# Patient Record
Sex: MALE | Race: WHITE | NOT HISPANIC OR LATINO | Employment: OTHER | URBAN - METROPOLITAN AREA
[De-identification: names, ages, dates, MRNs, and addresses within clinical notes are randomized per-mention and may not be internally consistent; named-entity substitution may affect disease eponyms.]

---

## 2023-03-08 DIAGNOSIS — E78.5 HYPERLIPIDEMIA, UNSPECIFIED HYPERLIPIDEMIA TYPE: ICD-10-CM

## 2023-03-08 RX ORDER — AMLODIPINE BESYLATE 5 MG/1
5 TABLET ORAL DAILY
COMMUNITY
End: 2023-05-04 | Stop reason: SDUPTHER

## 2023-03-08 RX ORDER — TRAMADOL HYDROCHLORIDE 50 MG/1
TABLET ORAL EVERY 6 HOURS PRN
COMMUNITY

## 2023-03-08 RX ORDER — BIOTIN 10 MG
1 TABLET ORAL DAILY
COMMUNITY

## 2023-03-08 RX ORDER — NAPROXEN SODIUM 220 MG/1
1 TABLET, FILM COATED ORAL DAILY
COMMUNITY
Start: 2022-07-14

## 2023-03-08 RX ORDER — NITROGLYCERIN 0.4 MG/1
TABLET SUBLINGUAL EVERY 5 MIN PRN
COMMUNITY
Start: 2022-08-03

## 2023-03-08 RX ORDER — LORAZEPAM 1 MG/1
TABLET ORAL
COMMUNITY
Start: 2023-01-09

## 2023-03-08 RX ORDER — GUAIFENESIN 600 MG/1
1 TABLET, EXTENDED RELEASE ORAL EVERY 12 HOURS
COMMUNITY
Start: 2022-08-10

## 2023-03-08 RX ORDER — OXYCODONE AND ACETAMINOPHEN 10; 325 MG/1; MG/1
TABLET ORAL EVERY 6 HOURS PRN
COMMUNITY
Start: 2023-02-23

## 2023-03-08 RX ORDER — GABAPENTIN 400 MG/1
800 CAPSULE ORAL 3 TIMES DAILY
COMMUNITY
End: 2023-04-04 | Stop reason: SDUPTHER

## 2023-03-08 RX ORDER — ENOXAPARIN SODIUM 150 MG/ML
INJECTION SUBCUTANEOUS
COMMUNITY
Start: 2022-04-29

## 2023-03-08 RX ORDER — POTASSIUM CHLORIDE 20 MEQ/15ML
15 SOLUTION ORAL
COMMUNITY
Start: 2023-02-28 | End: 2023-05-04 | Stop reason: SDUPTHER

## 2023-03-08 RX ORDER — OMEPRAZOLE 40 MG/1
40 CAPSULE, DELAYED RELEASE ORAL DAILY
COMMUNITY
Start: 2020-01-06 | End: 2023-07-10 | Stop reason: SDUPTHER

## 2023-03-08 RX ORDER — HYDROXYZINE HYDROCHLORIDE 25 MG/1
25 TABLET, FILM COATED ORAL 3 TIMES DAILY PRN
COMMUNITY

## 2023-03-08 RX ORDER — TAMSULOSIN HYDROCHLORIDE 0.4 MG/1
2 CAPSULE ORAL NIGHTLY
COMMUNITY
Start: 2020-04-27

## 2023-03-08 RX ORDER — VITAMIN B COMPLEX
TABLET ORAL
COMMUNITY

## 2023-03-08 RX ORDER — ATORVASTATIN CALCIUM 20 MG/1
20 TABLET, FILM COATED ORAL NIGHTLY
Qty: 90 TABLET | Refills: 1 | Status: SHIPPED | OUTPATIENT
Start: 2023-03-08 | End: 2023-05-04 | Stop reason: SDUPTHER

## 2023-03-08 RX ORDER — TRAZODONE HYDROCHLORIDE 100 MG/1
1.5 TABLET ORAL NIGHTLY
COMMUNITY
Start: 2019-10-09 | End: 2023-03-28 | Stop reason: SDUPTHER

## 2023-03-08 RX ORDER — SUCRALFATE 1 G/1
TABLET ORAL
COMMUNITY

## 2023-03-08 RX ORDER — MONTELUKAST SODIUM 10 MG/1
10 TABLET ORAL NIGHTLY
COMMUNITY
Start: 2022-05-20

## 2023-03-08 RX ORDER — ATORVASTATIN CALCIUM 20 MG/1
20 TABLET, FILM COATED ORAL NIGHTLY
COMMUNITY
End: 2023-03-08 | Stop reason: SDUPTHER

## 2023-03-08 RX ORDER — ONDANSETRON HYDROCHLORIDE 8 MG/1
TABLET, FILM COATED ORAL 3 TIMES DAILY PRN
COMMUNITY
Start: 2023-01-25

## 2023-03-08 RX ORDER — PREDNISONE 20 MG/1
1 TABLET ORAL DAILY
COMMUNITY
Start: 2022-11-28

## 2023-03-08 RX ORDER — FLUTICASONE PROPIONATE 50 MCG
2 SPRAY, SUSPENSION (ML) NASAL DAILY
COMMUNITY
Start: 2022-02-09 | End: 2023-04-11 | Stop reason: SDUPTHER

## 2023-03-08 RX ORDER — TRIAMCINOLONE ACETONIDE 1 MG/G
CREAM TOPICAL 2 TIMES DAILY PRN
COMMUNITY
Start: 2023-02-08

## 2023-03-08 RX ORDER — ALBUTEROL SULFATE 90 UG/1
2 AEROSOL, METERED RESPIRATORY (INHALATION) EVERY 6 HOURS PRN
COMMUNITY
Start: 2019-10-10

## 2023-03-08 RX ORDER — BUSPIRONE HYDROCHLORIDE 5 MG/1
5 TABLET ORAL 4 TIMES DAILY
COMMUNITY

## 2023-03-08 RX ORDER — METOPROLOL TARTRATE 50 MG/1
50 TABLET ORAL DAILY
COMMUNITY
Start: 2019-10-24

## 2023-03-08 RX ORDER — CHOLECALCIFEROL (VITAMIN D3) 25 MCG
1 TABLET ORAL DAILY
COMMUNITY

## 2023-03-08 RX ORDER — WARFARIN SODIUM 5 MG/1
TABLET ORAL
COMMUNITY
Start: 2022-11-10 | End: 2023-04-11

## 2023-03-08 RX ORDER — ACETAMINOPHEN 650 MG/1
1300 TABLET, EXTENDED RELEASE ORAL EVERY 8 HOURS PRN
COMMUNITY
Start: 2019-11-26

## 2023-03-08 RX ORDER — PYRIDOXINE HCL (VITAMIN B6) 100 MG
1 TABLET ORAL DAILY
COMMUNITY

## 2023-03-08 RX ORDER — ATORVASTATIN CALCIUM 20 MG/1
20 TABLET, FILM COATED ORAL NIGHTLY
Qty: 90 TABLET | Refills: 1 | Status: CANCELLED | OUTPATIENT
Start: 2023-03-08

## 2023-03-08 RX ORDER — DIPHENHYDRAMINE HCL 12.5 MG/5ML
LIQUID ORAL
COMMUNITY
Start: 2023-02-18

## 2023-03-08 RX ORDER — FLUTICASONE PROPIONATE AND SALMETEROL 100; 50 UG/1; UG/1
1 POWDER RESPIRATORY (INHALATION) EVERY 12 HOURS
COMMUNITY
End: 2023-05-30 | Stop reason: SDUPTHER

## 2023-03-28 DIAGNOSIS — F41.9 ANXIETY: ICD-10-CM

## 2023-03-28 RX ORDER — TRAZODONE HYDROCHLORIDE 100 MG/1
150 TABLET ORAL NIGHTLY
Qty: 135 TABLET | Refills: 1 | Status: SHIPPED | OUTPATIENT
Start: 2023-03-28 | End: 2023-07-07

## 2023-04-04 DIAGNOSIS — M54.16 CHRONIC RADICULAR LUMBAR PAIN: Primary | ICD-10-CM

## 2023-04-04 DIAGNOSIS — G89.29 CHRONIC RADICULAR LUMBAR PAIN: Primary | ICD-10-CM

## 2023-04-04 RX ORDER — GABAPENTIN 400 MG/1
800 CAPSULE ORAL 3 TIMES DAILY
Qty: 540 CAPSULE | Refills: 0 | Status: SHIPPED | OUTPATIENT
Start: 2023-04-04 | End: 2023-08-15 | Stop reason: SDUPTHER

## 2023-04-06 DIAGNOSIS — J30.9 ALLERGIC RHINITIS, UNSPECIFIED SEASONALITY, UNSPECIFIED TRIGGER: ICD-10-CM

## 2023-04-06 DIAGNOSIS — I26.99 PULMONARY EMBOLISM, OTHER, UNSPECIFIED CHRONICITY, UNSPECIFIED WHETHER ACUTE COR PULMONALE PRESENT (MULTI): ICD-10-CM

## 2023-04-11 PROBLEM — I26.99 PULMONARY EMBOLISM (MULTI): Status: ACTIVE | Noted: 2023-04-11

## 2023-04-11 RX ORDER — WARFARIN SODIUM 5 MG/1
TABLET ORAL
Qty: 90 TABLET | Refills: 3 | Status: SHIPPED | OUTPATIENT
Start: 2023-04-11 | End: 2023-09-08 | Stop reason: CLARIF

## 2023-04-11 RX ORDER — FLUTICASONE PROPIONATE 50 MCG
2 SPRAY, SUSPENSION (ML) NASAL DAILY
Qty: 48 G | Refills: 1 | Status: SHIPPED | OUTPATIENT
Start: 2023-04-11 | End: 2023-09-08 | Stop reason: CLARIF

## 2023-05-04 DIAGNOSIS — E78.5 HYPERLIPIDEMIA, UNSPECIFIED HYPERLIPIDEMIA TYPE: ICD-10-CM

## 2023-05-04 DIAGNOSIS — I10 ESSENTIAL HYPERTENSION: ICD-10-CM

## 2023-05-04 RX ORDER — AMLODIPINE BESYLATE 5 MG/1
5 TABLET ORAL DAILY
Qty: 90 TABLET | Refills: 1 | Status: SHIPPED | OUTPATIENT
Start: 2023-05-04 | End: 2023-09-08 | Stop reason: CLARIF

## 2023-05-04 RX ORDER — POTASSIUM CHLORIDE 20 MEQ/15ML
20 SOLUTION ORAL ONCE
Qty: 90 ML | Refills: 1 | Status: SHIPPED | OUTPATIENT
Start: 2023-05-04 | End: 2023-05-04

## 2023-05-04 RX ORDER — ATORVASTATIN CALCIUM 20 MG/1
20 TABLET, FILM COATED ORAL NIGHTLY
Qty: 90 TABLET | Refills: 1 | Status: SHIPPED | OUTPATIENT
Start: 2023-05-04 | End: 2023-09-08 | Stop reason: CLARIF

## 2023-05-18 ENCOUNTER — PATIENT OUTREACH (OUTPATIENT)
Dept: CARE COORDINATION | Facility: CLINIC | Age: 63
End: 2023-05-18
Payer: COMMERCIAL

## 2023-05-18 DIAGNOSIS — D61.818 PANCYTOPENIA, ACQUIRED (MULTI): ICD-10-CM

## 2023-05-18 DIAGNOSIS — R53.1 GENERALIZED WEAKNESS: ICD-10-CM

## 2023-05-18 DIAGNOSIS — E43 SEVERE PROTEIN-CALORIE MALNUTRITION (MULTI): ICD-10-CM

## 2023-05-18 DIAGNOSIS — D53.9 MACROCYTIC ANEMIA: ICD-10-CM

## 2023-05-18 PROCEDURE — G0180 MD CERTIFICATION HHA PATIENT: HCPCS | Performed by: NURSE PRACTITIONER

## 2023-05-18 NOTE — PROGRESS NOTES
Discharge Facility:[facility]  Discharge Diagnosis:E43 Severe protein-calorie malnutrition, D53.9 Macrocytic anemia, D61.818 Pancytopenia, acquired, R53.1 Generalized weakness  Admission Date:5/13/23  Discharge Date: 5/17/23    PCP Appointment Date:5/24/23  Specialist Appointment Date:   Hospital Encounter and Summary: Linked   See discharge assessment below for further details    Engagement  Call Start Time: 1422 (5/18/2023  2:32 PM)    Medications  Medications reviewed with patient/caregiver?: Not applicable (5/18/2023  2:32 PM)  Is the patient having any side effects they believe may be caused by any medication additions or changes?: No (5/18/2023  2:32 PM)  Does the patient have all medications ordered at discharge?: Not applicable (5/18/2023  2:32 PM)  Care Management Interventions: Provided patient education (5/18/2023  2:32 PM)  Is the patient taking all medications as directed (includes completed medication regime)?: Not applicable (5/18/2023  2:32 PM)  Care Management Interventions: Provided patient education (5/18/2023  2:32 PM)    Appointments  Does the patient have a primary care provider?: Yes (5/18/2023  2:32 PM)  Care Management Interventions: Verified appointment date/time/provider (5/18/2023  2:32 PM)  Has the patient kept scheduled appointments due by today?: Yes (5/18/2023  2:32 PM)  Care Management Interventions: Advised patient to keep appointment; Educated on importance of keeping appointment (5/18/2023  2:32 PM)    Self Management  What is the home health agency?: [facility] (5/18/2023  2:32 PM)  Has home health visited the patient within 72 hours of discharge?: Yes (5/18/2023  2:32 PM)  What Durable Medical Equipment (DME) was ordered?: Oxygen (5/18/2023  2:32 PM)  Has all Durable Medical Equipment (DME) been delivered?: -- (Patient not sure if oxygen has been delivered at this time) (5/18/2023  2:32 PM)    Patient Teaching  Does the patient have access to their discharge instructions?: Yes  (5/18/2023  2:32 PM)  Care Management Interventions: Reviewed instructions with patient (5/18/2023  2:32 PM)  What is the patient's perception of their health status since discharge?: Improving (5/18/2023  2:32 PM)  Is the patient/caregiver able to teach back the hierarchy of who to call/visit for symptoms/problems? PCP, Specialist, Home Health nurse, Urgent Care, ED, 911: Yes (5/18/2023  2:32 PM)  Patient/Caregiver Education Comments: Patient states he is improving but did have a fall at home after being discharged (5/18/2023  2:32 PM)

## 2023-05-24 ENCOUNTER — APPOINTMENT (OUTPATIENT)
Dept: PRIMARY CARE | Facility: CLINIC | Age: 63
End: 2023-05-24
Payer: COMMERCIAL

## 2023-05-24 DIAGNOSIS — C15.5 MALIGNANT NEOPLASM OF LOWER THIRD OF ESOPHAGUS (MULTI): ICD-10-CM

## 2023-05-24 DIAGNOSIS — J44.9 CHRONIC OBSTRUCTIVE PULMONARY DISEASE, UNSPECIFIED COPD TYPE (MULTI): ICD-10-CM

## 2023-05-24 PROBLEM — R35.0 INCREASED URINARY FREQUENCY: Status: ACTIVE | Noted: 2023-05-24

## 2023-05-24 PROBLEM — K22.89 ESOPHAGEAL MASS: Status: ACTIVE | Noted: 2023-05-24

## 2023-05-24 NOTE — PROGRESS NOTES
Pt called stating that he needs Rx Orders for a Wide Walker, and a Wide Portable Commode.  Rxs proposed to go to [facility].

## 2023-05-26 ENCOUNTER — TELEPHONE (OUTPATIENT)
Dept: PRIMARY CARE | Facility: CLINIC | Age: 63
End: 2023-05-26
Payer: COMMERCIAL

## 2023-05-26 NOTE — TELEPHONE ENCOUNTER
Stated that pt currently has liquid potassium.  Inquiring if you would send in a tablet or capsule instead.  Pt does not like the taste of the liquid.

## 2023-05-30 DIAGNOSIS — J44.9 CHRONIC OBSTRUCTIVE PULMONARY DISEASE, UNSPECIFIED COPD TYPE (MULTI): Primary | ICD-10-CM

## 2023-05-30 RX ORDER — FLUTICASONE PROPIONATE AND SALMETEROL 100; 50 UG/1; UG/1
1 POWDER RESPIRATORY (INHALATION) EVERY 12 HOURS
Qty: 180 EACH | Refills: 1 | Status: SHIPPED | OUTPATIENT
Start: 2023-05-30 | End: 2023-06-14 | Stop reason: SDUPTHER

## 2023-05-31 ENCOUNTER — APPOINTMENT (OUTPATIENT)
Dept: PRIMARY CARE | Facility: CLINIC | Age: 63
End: 2023-05-31
Payer: COMMERCIAL

## 2023-06-06 ENCOUNTER — PATIENT OUTREACH (OUTPATIENT)
Dept: CARE COORDINATION | Facility: CLINIC | Age: 63
End: 2023-06-06
Payer: COMMERCIAL

## 2023-06-06 DIAGNOSIS — I95.9 HYPOTENSION, UNSPECIFIED HYPOTENSION TYPE: ICD-10-CM

## 2023-06-06 RX ORDER — HYDROCHLOROTHIAZIDE 12.5 MG/1
CAPSULE ORAL
COMMUNITY
Start: 2023-06-05 | End: 2023-07-04

## 2023-06-06 RX ORDER — L. ACIDOPHILUS/LACTOBAC SPOR 35MM-25MM
TABLET ORAL
COMMUNITY
Start: 2023-06-05 | End: 2023-07-05 | Stop reason: SDUPTHER

## 2023-06-06 RX ORDER — FLUDROCORTISONE ACETATE 0.1 MG/1
1 TABLET ORAL DAILY
Qty: 29 TABLET | Refills: 0 | COMMUNITY
Start: 2023-06-05 | End: 2023-07-04

## 2023-06-06 NOTE — PROGRESS NOTES
Discharge Facility:[facility]  Discharge Diagnosis:I95.9 Hypotension  Admission Date:5/31/23  Discharge Date:6/5/23    PCP Appointment Date:6/14/23  Specialist Appointment Date:   Hospital Encounter and Summary: Linked   See discharge assessment below for further details    Engagement  Call Start Time: 0954 (6/6/2023  9:59 AM)    Medications  Medications reviewed with patient/caregiver?: Yes (6/6/2023  9:59 AM)  Is the patient having any side effects they believe may be caused by any medication additions or changes?: No (6/6/2023  9:59 AM)  Does the patient have all medications ordered at discharge?: Yes (6/6/2023  9:59 AM)  Care Management Interventions: Provided patient education (6/6/2023  9:59 AM)  Is the patient taking all medications as directed (includes completed medication regime)?: Yes (6/6/2023  9:59 AM)  Care Management Interventions: Provided patient education (6/6/2023  9:59 AM)  Medication Comments: see med list (6/6/2023  9:59 AM)    Appointments  Does the patient have a primary care provider?: Yes (6/6/2023  9:59 AM)  Care Management Interventions: Verified appointment date/time/provider (6/6/2023  9:59 AM)  Has the patient kept scheduled appointments due by today?: Yes (6/6/2023  9:59 AM)  Care Management Interventions: Advised patient to keep appointment; Educated on importance of keeping appointment (6/6/2023  9:59 AM)    Self Management  What is the home health agency?: [facility] (6/6/2023  9:59 AM)  Has home health visited the patient within 72 hours of discharge?: Call prior to 72 hours (6/6/2023  9:59 AM)    Patient Teaching  Does the patient have access to their discharge instructions?: Yes (6/6/2023  9:59 AM)  Care Management Interventions: Reviewed instructions with patient (6/6/2023  9:59 AM)  What is the patient's perception of their health status since discharge?: Improving (6/6/2023  9:59 AM)  Is the patient/caregiver able to teach back the hierarchy of who to call/visit for  symptoms/problems? PCP, Specialist, Home Health nurse, Urgent Care, ED, 911: Yes (6/6/2023  9:59 AM)

## 2023-06-08 ENCOUNTER — DOCUMENTATION (OUTPATIENT)
Dept: CARE COORDINATION | Facility: CLINIC | Age: 63
End: 2023-06-08
Payer: COMMERCIAL

## 2023-06-09 ENCOUNTER — TELEPHONE (OUTPATIENT)
Dept: PRIMARY CARE | Facility: CLINIC | Age: 63
End: 2023-06-09
Payer: COMMERCIAL

## 2023-06-09 NOTE — TELEPHONE ENCOUNTER
Radha with [facility] called stating they had a visit for follow up from hospital.  They have order to draw CBC.  Inquiring if you want to add any orders.  Will fax order to [phone], if orders added.

## 2023-06-13 ENCOUNTER — TELEPHONE (OUTPATIENT)
Dept: PRIMARY CARE | Facility: CLINIC | Age: 63
End: 2023-06-13
Payer: COMMERCIAL

## 2023-06-13 NOTE — TELEPHONE ENCOUNTER
Spoke to Amos. Someone from oncology/hematology spoke with them this morning and he is increasing his potassium, and they changed his diuretic. He was unsure what they changed it to. He has a follow up with me tomorrow afternoon. He was told to bring his new medications with him to his appointment, so we can update his chart. He verbalized understanding.     ----- Message from Erica Allred DO sent at 6/12/2023  9:53 PM EDT -----  Regarding: K  Took call from lab with critical potassium of 2.6. I tried to call patient to discuss/assess, but unable to reach him x2.

## 2023-06-14 ENCOUNTER — OFFICE VISIT (OUTPATIENT)
Dept: PRIMARY CARE | Facility: CLINIC | Age: 63
End: 2023-06-14
Payer: COMMERCIAL

## 2023-06-14 VITALS
DIASTOLIC BLOOD PRESSURE: 76 MMHG | SYSTOLIC BLOOD PRESSURE: 122 MMHG | HEIGHT: 72 IN | BODY MASS INDEX: 43.54 KG/M2 | OXYGEN SATURATION: 97 % | HEART RATE: 102 BPM

## 2023-06-14 DIAGNOSIS — I26.99 PULMONARY EMBOLISM WITHOUT ACUTE COR PULMONALE, UNSPECIFIED CHRONICITY, UNSPECIFIED PULMONARY EMBOLISM TYPE (MULTI): ICD-10-CM

## 2023-06-14 DIAGNOSIS — J44.9 CHRONIC OBSTRUCTIVE PULMONARY DISEASE, UNSPECIFIED COPD TYPE (MULTI): Primary | ICD-10-CM

## 2023-06-14 DIAGNOSIS — I10 ESSENTIAL HYPERTENSION: ICD-10-CM

## 2023-06-14 DIAGNOSIS — R53.1 WEAKNESS: ICD-10-CM

## 2023-06-14 DIAGNOSIS — F33.9 DEPRESSION, RECURRENT (CMS-HCC): ICD-10-CM

## 2023-06-14 DIAGNOSIS — E66.01 CLASS 3 SEVERE OBESITY DUE TO EXCESS CALORIES WITH SERIOUS COMORBIDITY AND BODY MASS INDEX (BMI) OF 40.0 TO 44.9 IN ADULT (MULTI): ICD-10-CM

## 2023-06-14 PROBLEM — F41.9 ANXIETY: Status: ACTIVE | Noted: 2018-06-20

## 2023-06-14 PROBLEM — E29.1 HYPOGONADISM IN MALE: Status: ACTIVE | Noted: 2023-06-14

## 2023-06-14 PROBLEM — R06.00 DYSPNEA: Status: ACTIVE | Noted: 2017-06-26

## 2023-06-14 PROBLEM — R35.1 NOCTURIA: Status: RESOLVED | Noted: 2023-06-14 | Resolved: 2023-06-14

## 2023-06-14 PROBLEM — F17.210 SMOKING GREATER THAN 20 PACK YEARS: Status: ACTIVE | Noted: 2023-06-14

## 2023-06-14 PROBLEM — M19.019 OSTEOARTHRITIS OF AC (ACROMIOCLAVICULAR) JOINT: Status: RESOLVED | Noted: 2023-06-14 | Resolved: 2023-06-14

## 2023-06-14 PROBLEM — Z98.890 HISTORY OF STRABISMUS SURGERY: Status: RESOLVED | Noted: 2019-09-24 | Resolved: 2023-06-14

## 2023-06-14 PROBLEM — M75.42 IMPINGEMENT SYNDROME OF LEFT SHOULDER: Status: ACTIVE | Noted: 2023-06-14

## 2023-06-14 PROBLEM — K50.111: Status: ACTIVE | Noted: 2023-06-14

## 2023-06-14 PROBLEM — E87.6 HYPOKALEMIA: Status: ACTIVE | Noted: 2023-06-14

## 2023-06-14 PROBLEM — M75.102 TEAR OF LEFT ROTATOR CUFF: Status: RESOLVED | Noted: 2023-06-14 | Resolved: 2023-06-14

## 2023-06-14 PROBLEM — D53.9 MACROCYTIC ANEMIA: Status: ACTIVE | Noted: 2023-06-14

## 2023-06-14 PROBLEM — R13.10 DIFFICULTY SWALLOWING: Status: ACTIVE | Noted: 2023-06-14

## 2023-06-14 PROBLEM — R31.9 HEMATURIA: Status: ACTIVE | Noted: 2023-06-14

## 2023-06-14 PROBLEM — R94.31 PROLONGED QT INTERVAL: Status: RESOLVED | Noted: 2017-06-27 | Resolved: 2023-06-14

## 2023-06-14 PROBLEM — E43 SEVERE PROTEIN-CALORIE MALNUTRITION (GOMEZ: LESS THAN 60% OF STANDARD WEIGHT) (MULTI): Status: ACTIVE | Noted: 2023-06-14

## 2023-06-14 PROBLEM — N52.9 ERECTILE DYSFUNCTION: Status: ACTIVE | Noted: 2023-06-14

## 2023-06-14 PROBLEM — N40.1 BENIGN PROSTATIC HYPERPLASIA WITH LOWER URINARY TRACT SYMPTOMS: Status: ACTIVE | Noted: 2023-06-14

## 2023-06-14 PROBLEM — E78.5 HYPERLIPIDEMIA: Status: ACTIVE | Noted: 2023-06-14

## 2023-06-14 PROBLEM — K21.9 GERD (GASTROESOPHAGEAL REFLUX DISEASE): Status: ACTIVE | Noted: 2023-06-14

## 2023-06-14 PROBLEM — I25.10 CORONARY ARTERY DISEASE: Status: ACTIVE | Noted: 2019-09-27

## 2023-06-14 PROBLEM — G47.00 INSOMNIA: Status: ACTIVE | Noted: 2023-06-14

## 2023-06-14 PROBLEM — D64.9 ANEMIA: Status: ACTIVE | Noted: 2023-06-14

## 2023-06-14 PROBLEM — N32.9 LESION OF BLADDER: Status: ACTIVE | Noted: 2023-06-14

## 2023-06-14 PROBLEM — E53.8 VITAMIN B12 DEFICIENCY: Status: ACTIVE | Noted: 2023-06-14

## 2023-06-14 PROBLEM — Z98.42 STATUS POST CATARACT EXTRACTION AND INSERTION OF INTRAOCULAR LENS OF LEFT EYE: Status: RESOLVED | Noted: 2019-12-17 | Resolved: 2023-06-14

## 2023-06-14 PROBLEM — Z96.1 STATUS POST CATARACT EXTRACTION AND INSERTION OF INTRAOCULAR LENS OF LEFT EYE: Status: RESOLVED | Noted: 2019-12-17 | Resolved: 2023-06-14

## 2023-06-14 PROBLEM — M75.82 TENDINITIS OF LEFT ROTATOR CUFF: Status: RESOLVED | Noted: 2023-06-14 | Resolved: 2023-06-14

## 2023-06-14 PROCEDURE — 99495 TRANSJ CARE MGMT MOD F2F 14D: CPT | Performed by: NURSE PRACTITIONER

## 2023-06-14 PROCEDURE — 3074F SYST BP LT 130 MM HG: CPT | Performed by: NURSE PRACTITIONER

## 2023-06-14 PROCEDURE — 3078F DIAST BP <80 MM HG: CPT | Performed by: NURSE PRACTITIONER

## 2023-06-14 RX ORDER — FLUTICASONE PROPIONATE AND SALMETEROL 100; 50 UG/1; UG/1
1 POWDER RESPIRATORY (INHALATION) EVERY 12 HOURS
Qty: 180 EACH | Refills: 1 | Status: SHIPPED | OUTPATIENT
Start: 2023-06-14 | End: 2023-09-08 | Stop reason: CLARIF

## 2023-06-14 RX ORDER — FAMOTIDINE 20 MG/1
TABLET, FILM COATED ORAL
COMMUNITY
Start: 2017-05-28

## 2023-06-14 ASSESSMENT — PATIENT HEALTH QUESTIONNAIRE - PHQ9
4. FEELING TIRED OR HAVING LITTLE ENERGY: NEARLY EVERY DAY
7. TROUBLE CONCENTRATING ON THINGS, SUCH AS READING THE NEWSPAPER OR WATCHING TELEVISION: NEARLY EVERY DAY
SUM OF ALL RESPONSES TO PHQ QUESTIONS 1-9: 24
8. MOVING OR SPEAKING SO SLOWLY THAT OTHER PEOPLE COULD HAVE NOTICED. OR THE OPPOSITE, BEING SO FIGETY OR RESTLESS THAT YOU HAVE BEEN MOVING AROUND A LOT MORE THAN USUAL: NEARLY EVERY DAY
9. THOUGHTS THAT YOU WOULD BE BETTER OFF DEAD, OR OF HURTING YOURSELF: NOT AT ALL
1. LITTLE INTEREST OR PLEASURE IN DOING THINGS: NEARLY EVERY DAY
10. IF YOU CHECKED OFF ANY PROBLEMS, HOW DIFFICULT HAVE THESE PROBLEMS MADE IT FOR YOU TO DO YOUR WORK, TAKE CARE OF THINGS AT HOME, OR GET ALONG WITH OTHER PEOPLE: VERY DIFFICULT
2. FEELING DOWN, DEPRESSED OR HOPELESS: NEARLY EVERY DAY
5. POOR APPETITE OR OVEREATING: NEARLY EVERY DAY
6. FEELING BAD ABOUT YOURSELF - OR THAT YOU ARE A FAILURE OR HAVE LET YOURSELF OR YOUR FAMILY DOWN: NEARLY EVERY DAY
3. TROUBLE FALLING OR STAYING ASLEEP OR SLEEPING TOO MUCH: NEARLY EVERY DAY
SUM OF ALL RESPONSES TO PHQ9 QUESTIONS 1 AND 2: 6

## 2023-06-14 ASSESSMENT — ANXIETY QUESTIONNAIRES
6. BECOMING EASILY ANNOYED OR IRRITABLE: NEARLY EVERY DAY
2. NOT BEING ABLE TO STOP OR CONTROL WORRYING: NEARLY EVERY DAY
7. FEELING AFRAID AS IF SOMETHING AWFUL MIGHT HAPPEN: NEARLY EVERY DAY
4. TROUBLE RELAXING: NEARLY EVERY DAY
5. BEING SO RESTLESS THAT IT IS HARD TO SIT STILL: NOT AT ALL
1. FEELING NERVOUS, ANXIOUS, OR ON EDGE: NEARLY EVERY DAY
3. WORRYING TOO MUCH ABOUT DIFFERENT THINGS: NEARLY EVERY DAY
IF YOU CHECKED OFF ANY PROBLEMS ON THIS QUESTIONNAIRE, HOW DIFFICULT HAVE THESE PROBLEMS MADE IT FOR YOU TO DO YOUR WORK, TAKE CARE OF THINGS AT HOME, OR GET ALONG WITH OTHER PEOPLE: VERY DIFFICULT
GAD7 TOTAL SCORE: 18

## 2023-06-14 NOTE — PROGRESS NOTES
Subjective   Patient ID: Nilesh Miramontes is a 62 y.o. male who presents for [facility] follow up; Right hand/finger/wrist numbness; left foot/toe pain; bed sores on buttocks.      HPI  Nilesh returns for hospital follow up.      Review of Systems    Objective   Vitals:    23 1529   BP: 122/76   BP Location: Right arm   Pulse: 102   SpO2: 97%   Height: 1.829 m (6')      Physical Exam  Vitals and nursing note reviewed.   Constitutional:       Appearance: Normal appearance. He is ill-appearing.   HENT:      Head: Normocephalic and atraumatic.   Cardiovascular:      Rate and Rhythm: Regular rhythm. Tachycardia present.      Pulses: Normal pulses.      Heart sounds: Normal heart sounds.   Pulmonary:      Effort: Pulmonary effort is normal.      Breath sounds: Normal breath sounds.      Comments: On oxygen per NC  Abdominal:      General: Bowel sounds are normal.      Palpations: Abdomen is soft.   Musculoskeletal:      Right lower le+ Pitting Edema present.      Left lower le+ Pitting Edema present.   Neurological:      Mental Status: He is alert and oriented to person, place, and time.   Psychiatric:         Mood and Affect: Mood normal.         Behavior: Behavior normal.         Thought Content: Thought content normal.         Judgment: Judgment normal.         Assessment/Plan   Diagnoses and all orders for this visit:  Chronic obstructive pulmonary disease, unspecified COPD type (CMS/HCC)  -     fluticasone propion-salmeteroL (Advair Diskus) 100-50 mcg/dose diskus inhaler; Inhale 1 puff every 12 hours.      Problem List Items Addressed This Visit       COPD (chronic obstructive pulmonary disease) (CMS/HCC)    Relevant Medications    fluticasone propion-salmeteroL (Advair Diskus) 100-50 mcg/dose diskus inhaler       Follow up as scheduled for medicare wellness exam.

## 2023-06-19 PROBLEM — R35.0 INCREASED URINARY FREQUENCY: Status: RESOLVED | Noted: 2023-05-24 | Resolved: 2023-06-19

## 2023-06-19 PROBLEM — E66.01 CLASS 3 SEVERE OBESITY DUE TO EXCESS CALORIES WITH SERIOUS COMORBIDITY AND BODY MASS INDEX (BMI) OF 40.0 TO 44.9 IN ADULT (MULTI): Status: ACTIVE | Noted: 2023-06-19

## 2023-06-19 PROBLEM — F33.9 DEPRESSION, RECURRENT (CMS-HCC): Status: ACTIVE | Noted: 2023-06-19

## 2023-06-19 LAB
ALANINE AMINOTRANSFERASE (SGPT) (U/L) IN SER/PLAS: 20 U/L (ref 10–52)
ALBUMIN (G/DL) IN SER/PLAS: 2.3 G/DL (ref 3.4–5)
ALKALINE PHOSPHATASE (U/L) IN SER/PLAS: 81 U/L (ref 33–136)
ANION GAP IN SER/PLAS: 11 MMOL/L (ref 10–20)
ASPARTATE AMINOTRANSFERASE (SGOT) (U/L) IN SER/PLAS: 37 U/L (ref 9–39)
BILIRUBIN TOTAL (MG/DL) IN SER/PLAS: 0.9 MG/DL (ref 0–1.2)
CALCIDIOL (25 OH VITAMIN D3) (NG/ML) IN SER/PLAS: 80 NG/ML
CALCIUM (MG/DL) IN SER/PLAS: 8.1 MG/DL (ref 8.6–10.3)
CARBON DIOXIDE, TOTAL (MMOL/L) IN SER/PLAS: 25 MMOL/L (ref 21–32)
CHLORIDE (MMOL/L) IN SER/PLAS: 99 MMOL/L (ref 98–107)
COBALAMIN (VITAMIN B12) (PG/ML) IN SER/PLAS: 1608 PG/ML (ref 211–911)
CREATININE (MG/DL) IN SER/PLAS: 0.7 MG/DL (ref 0.5–1.3)
GFR MALE: >90 ML/MIN/1.73M2
GLUCOSE (MG/DL) IN SER/PLAS: 88 MG/DL (ref 74–99)
MAGNESIUM (MG/DL) IN SER/PLAS: 1.7 MG/DL (ref 1.6–2.4)
NATRIURETIC PEPTIDE B (PG/ML) IN SER/PLAS: 177 PG/ML (ref 0–99)
POTASSIUM (MMOL/L) IN SER/PLAS: 3.1 MMOL/L (ref 3.5–5.3)
PROSTATE SPECIFIC ANTIGEN,SCREEN: 0.37 NG/ML (ref 0–4)
PROTEIN TOTAL: 7.6 G/DL (ref 6.4–8.2)
SODIUM (MMOL/L) IN SER/PLAS: 132 MMOL/L (ref 136–145)
UREA NITROGEN (MG/DL) IN SER/PLAS: 5 MG/DL (ref 6–23)

## 2023-06-19 ASSESSMENT — ENCOUNTER SYMPTOMS
APPETITE CHANGE: 1
ABDOMINAL PAIN: 0
FATIGUE: 1
SHORTNESS OF BREATH: 0
CHEST TIGHTNESS: 0
PSYCHIATRIC NEGATIVE: 1
DIZZINESS: 0
VOMITING: 0
PALPITATIONS: 0
MYALGIAS: 0
COLOR CHANGE: 0
TROUBLE SWALLOWING: 1
ARTHRALGIAS: 1
DYSURIA: 0
CONSTIPATION: 0
DIARRHEA: 0
BLOOD IN STOOL: 0
LIGHT-HEADEDNESS: 0
NAUSEA: 0
HEADACHES: 0
NUMBNESS: 1

## 2023-06-19 NOTE — PROGRESS NOTES
"Patient: Nilesh Miramontes  : 1960  PCP: PITO Lowery-CNP  MRN: [id number]  Program: No linked episodes     Nilesh Miramontes is a 62 y.o. male presenting today for follow-up after being discharged from the hospital 9 days ago. The main problem requiring admission was hypotension. The discharge summary and/or Transitional Care Management documentation was reviewed. Medication reconciliation was performed as indicated via the \"Anoop as Reviewed\" timestamp.     Nilesh Miramontes was contacted by Transitional Care Management services two days after his discharge. This encounter and supporting documentation was reviewed.    The complexity of medical decision making for this patient's transitional care is moderate.    Physical Exam  Vitals and nursing note reviewed.   Constitutional:       General: He is not in acute distress.     Appearance: Normal appearance. He is ill-appearing. He is not toxic-appearing or diaphoretic.   HENT:      Head: Normocephalic and atraumatic.   Cardiovascular:      Rate and Rhythm: Regular rhythm. Tachycardia present.      Heart sounds: Normal heart sounds.   Pulmonary:      Effort: Pulmonary effort is normal.      Breath sounds: Normal breath sounds.      Comments: On oxygen per [place] 2L  Musculoskeletal:      Right lower le+ Pitting Edema present.      Left lower le+ Pitting Edema present.   Skin:     General: Skin is warm and dry.   Neurological:      Mental Status: He is alert and oriented to person, place, and time.   Psychiatric:         Mood and Affect: Mood normal.         Behavior: Behavior normal.         Thought Content: Thought content normal.         Judgment: Judgment normal.         Assessment/Plan   Problem List Items Addressed This Visit       Pulmonary embolism (CMS/HCC)     On coumadin-managed in anti-coag clinic         Essential hypertension     Normotensive today         COPD (chronic obstructive pulmonary disease) (CMS/HCC) - Primary     Stable  Advair- " refilled         Relevant Medications    fluticasone propion-salmeteroL (Advair Diskus) 100-50 mcg/dose diskus inhaler    Weakness    Depression, recurrent (CMS/[facility])     Stable  Hydroxyzine and trazodone         Class 3 severe obesity due to excess calories with serious comorbidity and body mass index (BMI) of 40.0 to 44.9 in adult (CMS/[facility])       Review of Systems   Constitutional:  Positive for appetite change and fatigue.   HENT:  Positive for trouble swallowing.    Respiratory:  Negative for chest tightness and shortness of breath.    Cardiovascular:  Negative for chest pain, palpitations and leg swelling.   Gastrointestinal:  Negative for abdominal pain, blood in stool, constipation, diarrhea, nausea and vomiting.   Genitourinary:  Negative for dysuria.   Musculoskeletal:  Positive for arthralgias (left shoulder pain). Negative for myalgias.   Skin:  Negative for color change.   Allergic/Immunologic: Environmental allergies: VS.   Neurological:  Positive for numbness (right hand/fingers). Negative for dizziness, light-headedness and headaches.   Psychiatric/Behavioral: Negative.         Family History   Problem Relation Name Age of Onset    No Known Problems Mother      No Known Problems Father         Engagement  Call Start Time: 0954 (6/6/2023  9:59 AM)    Medications  Medications reviewed with patient/caregiver?: Yes (6/6/2023  9:59 AM)  Is the patient having any side effects they believe may be caused by any medication additions or changes?: No (6/6/2023  9:59 AM)  Does the patient have all medications ordered at discharge?: Yes (6/6/2023  9:59 AM)  Care Management Interventions: Provided patient education (6/6/2023  9:59 AM)  Is the patient taking all medications as directed (includes completed medication regime)?: Yes (6/6/2023  9:59 AM)  Care Management Interventions: Provided patient education (6/6/2023  9:59 AM)  Medication Comments: see med list (6/6/2023  9:59 AM)    Appointments  Does the patient  have a primary care provider?: Yes (6/6/2023  9:59 AM)  Care Management Interventions: Verified appointment date/time/provider (6/6/2023  9:59 AM)  Has the patient kept scheduled appointments due by today?: Yes (6/6/2023  9:59 AM)  Care Management Interventions: Advised patient to keep appointment; Educated on importance of keeping appointment (6/6/2023  9:59 AM)    Self Management  What is the home health agency?: [facility] (6/6/2023  9:59 AM)  Has home health visited the patient within 72 hours of discharge?: Call prior to 72 hours (6/6/2023  9:59 AM)    Patient Teaching  Does the patient have access to their discharge instructions?: Yes (6/6/2023  9:59 AM)  Care Management Interventions: Reviewed instructions with patient (6/6/2023  9:59 AM)  What is the patient's perception of their health status since discharge?: Improving (6/6/2023  9:59 AM)  Is the patient/caregiver able to teach back the hierarchy of who to call/visit for symptoms/problems? PCP, Specialist, Home Health nurse, Urgent Care, ED, 911: Yes (6/6/2023  9:59 AM)        No follow-ups on file.    He also has complaints today of right hand/finger numbness, left foot/toe pain, bed sores on buttocks.    HHC: nurse is applying proctofoam to area. Skin tears to buttocks. They are to teach, spouse, Ina how to help clean and treat area. Will continue to monitor.     VS; /76  Pulse: 102  SpO2: 97% on 2L oxygen/NC      Keep appt in 2 weeks for Medicare wellness exam.

## 2023-06-20 LAB
ESTIMATED AVERAGE GLUCOSE FOR HBA1C: 85 MG/DL
HEMOGLOBIN A1C/HEMOGLOBIN TOTAL IN BLOOD: 4.6 %

## 2023-06-21 ENCOUNTER — PATIENT OUTREACH (OUTPATIENT)
Dept: CARE COORDINATION | Facility: CLINIC | Age: 63
End: 2023-06-21
Payer: COMMERCIAL

## 2023-06-21 NOTE — PROGRESS NOTES
Outreach call to patient to support a smooth transition of care from recent admission.  Left voicemail message for patient with my contact information.  Enrolled patient in [employer] chatbot for additional support and patient education through transition period.  Will continue to monitor through transition period.    .ALEM Esparza, CM

## 2023-06-26 ENCOUNTER — DOCUMENTATION (OUTPATIENT)
Dept: CARE COORDINATION | Facility: CLINIC | Age: 63
End: 2023-06-26
Payer: COMMERCIAL

## 2023-06-28 ENCOUNTER — APPOINTMENT (OUTPATIENT)
Dept: PRIMARY CARE | Facility: CLINIC | Age: 63
End: 2023-06-28
Payer: COMMERCIAL

## 2023-06-28 DIAGNOSIS — R19.7 DIARRHEA, UNSPECIFIED TYPE: Primary | ICD-10-CM

## 2023-06-28 DIAGNOSIS — F41.9 ANXIETY: ICD-10-CM

## 2023-06-28 DIAGNOSIS — K21.9 GASTROESOPHAGEAL REFLUX DISEASE, UNSPECIFIED WHETHER ESOPHAGITIS PRESENT: ICD-10-CM

## 2023-07-03 ENCOUNTER — PATIENT OUTREACH (OUTPATIENT)
Dept: CARE COORDINATION | Facility: CLINIC | Age: 63
End: 2023-07-03
Payer: COMMERCIAL

## 2023-07-05 NOTE — TELEPHONE ENCOUNTER
Pt called stating that he is has been experiencing severe diarrhea since his hospital discharge on 7/3/23.  Stated that he was on antibiotics in the hospital, and is on antibiotics currently at home.  Also stated that his lasix is not working, still is retaining a lot of fluid in his legs.  Please advise.  Thank you.

## 2023-07-07 DIAGNOSIS — R19.7 DIARRHEA, UNSPECIFIED TYPE: Primary | ICD-10-CM

## 2023-07-07 RX ORDER — L. ACIDOPHILUS/LACTOBAC SPOR 35MM-25MM
1 TABLET ORAL DAILY
Qty: 90 TABLET | Refills: 1 | Status: SHIPPED | OUTPATIENT
Start: 2023-07-07 | End: 2023-08-06

## 2023-07-07 RX ORDER — TRAZODONE HYDROCHLORIDE 100 MG/1
TABLET ORAL
Qty: 135 TABLET | Refills: 3 | Status: SHIPPED | OUTPATIENT
Start: 2023-07-07 | End: 2023-09-08 | Stop reason: CLARIF

## 2023-07-10 RX ORDER — OMEPRAZOLE 40 MG/1
40 CAPSULE, DELAYED RELEASE ORAL DAILY
Qty: 90 CAPSULE | Refills: 1 | Status: SHIPPED | OUTPATIENT
Start: 2023-07-10 | End: 2023-09-08 | Stop reason: CLARIF

## 2023-07-11 ENCOUNTER — PATIENT OUTREACH (OUTPATIENT)
Dept: CARE COORDINATION | Facility: CLINIC | Age: 63
End: 2023-07-11
Payer: COMMERCIAL

## 2023-07-11 NOTE — PROGRESS NOTES
Outreach call to patient to support a smooth transition of care from recent admission.  Spoke with patient, reviewed discharge medications, discharge instructions, assessed social needs, and provided education on importance of follow-up appointment with provider. Enrolled patient in [employer] chatbot for additional support and education through transition period.  Will continue to monitor through transition period.    .ALEM Esparza, CM    Engagement  Call Start Time: 1040 (7/11/2023 10:47 AM)    Medications  Medications reviewed with patient/caregiver?: Yes (7/11/2023 10:47 AM)  Is the patient having any side effects they believe may be caused by any medication additions or changes?: No (7/11/2023 10:47 AM)  Does the patient have all medications ordered at discharge?: Yes (7/11/2023 10:47 AM)  Care Management Interventions: Provided patient education (7/11/2023 10:47 AM)  Is the patient taking all medications as directed (includes completed medication regime)?: Yes (7/11/2023 10:47 AM)    Appointments  Does the patient have a primary care provider?: Yes (7/11/2023 10:47 AM)  Care Management Interventions: Advised patient to make appointment (7/11/2023 10:47 AM)  Care Management Interventions: Advised patient to keep appointment (7/11/2023 10:47 AM)    Self Management  What is the home health agency?: No home care (7/11/2023 10:47 AM)  Has home health visited the patient within 72 hours of discharge?: Not applicable (7/11/2023 10:47 AM)  What Durable Medical Equipment (DME) was ordered?: None (7/11/2023 10:47 AM)    Patient Teaching  Does the patient have access to their discharge instructions?: Yes (7/11/2023 10:47 AM)  Care Management Interventions: Reviewed instructions with patient (7/11/2023 10:47 AM)  What is the patient's perception of their health status since discharge?: Improving (7/11/2023 10:47 AM)  Is the patient/caregiver able to teach back the hierarchy of who to call/visit for symptoms/problems? PCP,  Specialist, Home Health nurse, Urgent Care, ED, 911: Yes (7/11/2023 10:47 AM)    Wrap Up  Wrap Up Additional Comments: CM called and spoke with patient states he is home and doing better, Pt. has no c/o pain/ discomfort at this time. Pt. states he does wear oxygen while in the home and takes it off at times and uses his pulse meter to read his O2 levels. Pt. states he has all his medications and he takes them all.  Pt. has help at home currently, and does not have home care, he did have PT and he states he continues to do the excercises provided.  Pt. is aware to have his labs drawn prior to next cardiology appointment.  Reviewed DC summary. CM informed patient that CM will continue to outreach to patient for any needed support which patient verbally agreed and patient stated he appreciated the outreach call. Pt. has no questions or concerns.  ALEM Ventura, RN. (7/11/2023 10:47 AM)  Call End Time: 1045 (7/11/2023 10:47 AM)

## 2023-07-13 ENCOUNTER — PATIENT OUTREACH (OUTPATIENT)
Dept: CARE COORDINATION | Facility: CLINIC | Age: 63
End: 2023-07-13
Payer: COMMERCIAL

## 2023-07-13 NOTE — PROGRESS NOTES
Community Health Worker(CHW) received referral from Social Work for assistance with gas for his vehicle to get to and from follow up appointments. CHW suggested calling on patients behalf to [facility] to inquire about gas cards or mileage reimbursement. Patient was agreeable to CHW calling to inquire for him. CHW will outreach [facility] to inquire.

## 2023-07-13 NOTE — PROGRESS NOTES
Community Health Worker outreached patient to inform him that I was able to gather from [facility] that there is a gas reimbursement request form. Patient stated that he would like form to be sent to his email. Community Health Worker will follow up wit patient to ensure he was able to fill out request form. Please see details below.     [email or web address] [email or web address]

## 2023-07-13 NOTE — PROGRESS NOTES
Community Health Worker emailed patient upon his request to [email or web address]. Please see below:     Please click link below to access request form:     [email or web address]    [facility] Phone:  [phone]    Lewis Rowe, [facility]  Certified Community Health Worker  Office Hours  8am-4:30pm  Email  [email or web address]  Phone  [phone]

## 2023-07-17 ENCOUNTER — DOCUMENTATION (OUTPATIENT)
Dept: CARE COORDINATION | Facility: CLINIC | Age: 63
End: 2023-07-17
Payer: COMMERCIAL

## 2023-07-17 NOTE — PROGRESS NOTES
Patient currently has been readmitted to the hospital on 7/17/2023.  CM will follow up once patient has been discharged.  ALEM Lomax, RN.

## 2023-07-18 ENCOUNTER — APPOINTMENT (OUTPATIENT)
Dept: PRIMARY CARE | Facility: CLINIC | Age: 63
End: 2023-07-18
Payer: COMMERCIAL

## 2023-07-24 ENCOUNTER — PATIENT OUTREACH (OUTPATIENT)
Dept: CARE COORDINATION | Facility: CLINIC | Age: 63
End: 2023-07-24
Payer: COMMERCIAL

## 2023-07-24 DIAGNOSIS — R18.8 OTHER ASCITES: ICD-10-CM

## 2023-07-24 DIAGNOSIS — I95.9 HYPOTENSION, UNSPECIFIED HYPOTENSION TYPE: ICD-10-CM

## 2023-07-24 DIAGNOSIS — E87.6 HYPOKALEMIA: ICD-10-CM

## 2023-07-24 DIAGNOSIS — R06.02 SHORTNESS OF BREATH AT REST: ICD-10-CM

## 2023-07-24 RX ORDER — FLUDROCORTISONE ACETATE 0.1 MG/1
1 TABLET ORAL DAILY
Qty: 29 TABLET | Refills: 0 | COMMUNITY
Start: 2023-07-23 | End: 2023-08-21

## 2023-07-24 RX ORDER — POTASSIUM CHLORIDE 20 MEQ/1
40 TABLET, EXTENDED RELEASE ORAL 2 TIMES DAILY
COMMUNITY
Start: 2023-07-23

## 2023-07-24 RX ORDER — LANOLIN ALCOHOL/MO/W.PET/CERES
CREAM (GRAM) TOPICAL 2 TIMES DAILY
COMMUNITY
Start: 2023-07-23 | End: 2023-08-21

## 2023-07-26 ENCOUNTER — OFFICE VISIT (OUTPATIENT)
Dept: PRIMARY CARE | Facility: CLINIC | Age: 63
End: 2023-07-26
Payer: COMMERCIAL

## 2023-07-26 VITALS
HEART RATE: 87 BPM | OXYGEN SATURATION: 98 % | SYSTOLIC BLOOD PRESSURE: 106 MMHG | BODY MASS INDEX: 42.12 KG/M2 | DIASTOLIC BLOOD PRESSURE: 70 MMHG | WEIGHT: 311 LBS | HEIGHT: 72 IN

## 2023-07-26 DIAGNOSIS — R19.7 DIARRHEA, UNSPECIFIED TYPE: ICD-10-CM

## 2023-07-26 DIAGNOSIS — C15.5 MALIGNANT NEOPLASM OF LOWER THIRD OF ESOPHAGUS (MULTI): ICD-10-CM

## 2023-07-26 DIAGNOSIS — R60.0 BILATERAL LOWER EXTREMITY EDEMA: ICD-10-CM

## 2023-07-26 DIAGNOSIS — K22.89 ESOPHAGEAL MASS: Primary | ICD-10-CM

## 2023-07-26 PROBLEM — R79.89 ELEVATED TROPONIN: Status: RESOLVED | Noted: 2017-06-27 | Resolved: 2023-07-26

## 2023-07-26 PROBLEM — M25.512 BILATERAL SHOULDER PAIN: Status: RESOLVED | Noted: 2018-06-20 | Resolved: 2023-07-26

## 2023-07-26 PROBLEM — M19.90 DJD (DEGENERATIVE JOINT DISEASE): Status: ACTIVE | Noted: 2023-07-26

## 2023-07-26 PROBLEM — K50.90 CROHN'S DISEASE (MULTI): Status: ACTIVE | Noted: 2023-07-26

## 2023-07-26 PROBLEM — N18.1 STAGE 1 CHRONIC KIDNEY DISEASE: Status: ACTIVE | Noted: 2023-07-26

## 2023-07-26 PROBLEM — E87.20 LACTIC ACIDOSIS: Status: RESOLVED | Noted: 2023-07-26 | Resolved: 2023-07-26

## 2023-07-26 PROBLEM — R10.9 CHRONIC ABDOMINAL PAIN: Status: ACTIVE | Noted: 2023-07-26

## 2023-07-26 PROBLEM — R73.09 ELEVATED GLUCOSE: Status: RESOLVED | Noted: 2023-07-26 | Resolved: 2023-07-26

## 2023-07-26 PROBLEM — R07.9 CHEST PAIN: Status: RESOLVED | Noted: 2023-07-26 | Resolved: 2023-07-26

## 2023-07-26 PROBLEM — C15.9 MALIGNANT NEOPLASM OF ESOPHAGUS (MULTI): Status: ACTIVE | Noted: 2023-02-16

## 2023-07-26 PROBLEM — M51.36 BULGING LUMBAR DISC: Status: RESOLVED | Noted: 2023-07-26 | Resolved: 2023-07-26

## 2023-07-26 PROBLEM — M25.511 BILATERAL SHOULDER PAIN: Status: RESOLVED | Noted: 2018-06-20 | Resolved: 2023-07-26

## 2023-07-26 PROBLEM — R73.9 HYPERGLYCEMIA: Status: ACTIVE | Noted: 2023-07-26

## 2023-07-26 PROBLEM — I50.30 (HFPEF) HEART FAILURE WITH PRESERVED EJECTION FRACTION (MULTI): Status: ACTIVE | Noted: 2023-07-26

## 2023-07-26 PROBLEM — G89.29 CHRONIC ABDOMINAL PAIN: Status: ACTIVE | Noted: 2023-07-26

## 2023-07-26 PROBLEM — K52.9 COLITIS: Status: RESOLVED | Noted: 2023-07-26 | Resolved: 2023-07-26

## 2023-07-26 PROBLEM — D64.9 CHRONIC ANEMIA: Status: ACTIVE | Noted: 2023-06-14

## 2023-07-26 PROBLEM — A41.9 SEPSIS (MULTI): Status: RESOLVED | Noted: 2023-07-26 | Resolved: 2023-07-26

## 2023-07-26 PROBLEM — R18.8 ASCITES: Status: RESOLVED | Noted: 2023-07-26 | Resolved: 2023-07-26

## 2023-07-26 PROCEDURE — 4004F PT TOBACCO SCREEN RCVD TLK: CPT | Performed by: NURSE PRACTITIONER

## 2023-07-26 PROCEDURE — 3074F SYST BP LT 130 MM HG: CPT | Performed by: NURSE PRACTITIONER

## 2023-07-26 PROCEDURE — 3078F DIAST BP <80 MM HG: CPT | Performed by: NURSE PRACTITIONER

## 2023-07-26 PROCEDURE — 99496 TRANSJ CARE MGMT HIGH F2F 7D: CPT | Performed by: NURSE PRACTITIONER

## 2023-07-26 RX ORDER — AMMONIUM LACTATE 12 G/100G
LOTION TOPICAL AS NEEDED
Qty: 225 G | Refills: 0 | Status: SHIPPED | OUTPATIENT
Start: 2023-07-26 | End: 2023-09-08 | Stop reason: CLARIF

## 2023-07-26 RX ORDER — TRIAMTERENE AND HYDROCHLOROTHIAZIDE 37.5; 25 MG/1; MG/1
1 CAPSULE ORAL DAILY
COMMUNITY
Start: 2023-06-15

## 2023-07-26 RX ORDER — CIPROFLOXACIN 500 MG/1
1 TABLET ORAL DAILY
COMMUNITY
Start: 2022-06-09

## 2023-07-26 RX ORDER — FUROSEMIDE 40 MG/1
40 TABLET ORAL 2 TIMES DAILY
COMMUNITY
Start: 2023-07-03

## 2023-07-26 RX ORDER — BUMETANIDE 1 MG/1
1 TABLET ORAL DAILY
COMMUNITY
Start: 2023-07-10

## 2023-07-26 RX ORDER — MIRTAZAPINE 30 MG/1
1 TABLET, FILM COATED ORAL DAILY
COMMUNITY
Start: 2023-06-22

## 2023-07-26 NOTE — PROGRESS NOTES
Subjective   Patient ID: Nilesh Miramontes is a 63 y.o. male who presents for [facility] hospt follow up.      HPI  Nilesh returns with his wife, Ina for hospital follow up.     We discussed referral to palliative care for pain mgmt as he has several chronic disease processes, and currently finished treatments for esophageal cancer. He has follow up with oncology next month to determine next step.     Objective   Vitals:    23 1509   BP: 106/70   BP Location: Left arm   Pulse: 87   SpO2: 98%   Weight: 141 kg (311 lb)   Height: 1.829 m (6')          Assessment/Plan   Diagnoses and all orders for this visit:  Esophageal mass  -     Referral to Palliative Care; Future  Malignant neoplasm of lower third of esophagus (CMS/HCC)  -     Referral to Palliative Care; Future  Diarrhea, unspecified type  -     C. difficile, PCR; Future  -     Stool Pathogen Panel, PCR; Future  Bilateral lower extremity edema  -     ammonium lactate (Lac-Hydrin) 12 % lotion; Apply topically if needed for dry skin.      Problem List Items Addressed This Visit       Malignant neoplasm of esophagus (CMS/HCC)    Relevant Orders    Referral to Palliative Care    Esophageal mass - Primary    Relevant Orders    Referral to Palliative Care     Other Visit Diagnoses       Diarrhea, unspecified type        Relevant Orders    C. difficile, PCR (Completed)    Stool Pathogen Panel, PCR (Completed)    Bilateral lower extremity edema        Relevant Medications    ammonium lactate (Lac-Hydrin) 12 % lotion        Patient: Nilesh Miramontes  : 1960  PCP: PITO Lowery-CNP  MRN: [id number]  Program: No linked episodes     Nilesh Miramontes is a 63 y.o. male presenting today for follow-up after being discharged from the hospital 3 days ago. The main problem requiring admission was hypotension, hypokalemia, abdominal ascites, shortness of breath. The discharge summary and/or Transitional Care Management documentation was reviewed. Medication  "reconciliation was performed as indicated via the \"Anoop as Reviewed\" timestamp.     Nilesh Miramontes was contacted by Transitional Care Management services two days after his discharge. This encounter and supporting documentation was reviewed.    The complexity of medical decision making for this patient's transitional care is high.    Physical Exam  Vitals and nursing note reviewed.   Constitutional:       Appearance: Normal appearance.   Cardiovascular:      Rate and Rhythm: Normal rate and regular rhythm.      Heart sounds: Normal heart sounds.   Pulmonary:      Effort: Pulmonary effort is normal.      Breath sounds: Normal breath sounds.   Neurological:      Mental Status: He is alert and oriented to person, place, and time.   Psychiatric:         Mood and Affect: Mood normal.         Behavior: Behavior normal.         Thought Content: Thought content normal.         Judgment: Judgment normal.         Assessment/Plan   Problem List Items Addressed This Visit       Malignant neoplasm of esophagus (CMS/HCC)    Relevant Orders    Referral to Palliative Care    Esophageal mass - Primary    Relevant Orders    Referral to Palliative Care     Other Visit Diagnoses       Diarrhea, unspecified type        Relevant Orders    C. difficile, PCR (Completed)    Stool Pathogen Panel, PCR (Completed)    Bilateral lower extremity edema        Relevant Medications    ammonium lactate (Lac-Hydrin) 12 % lotion            Review of Systems   Constitutional:  Negative for fatigue.   Respiratory:  Negative for chest tightness and shortness of breath.    Cardiovascular:  Negative for chest pain, palpitations and leg swelling.   Gastrointestinal:  Negative for abdominal pain, blood in stool, constipation, diarrhea, nausea and vomiting.   Genitourinary:  Negative for dysuria.   Musculoskeletal:  Negative for arthralgias and myalgias.   Skin:  Negative for color change.   Neurological:  Negative for dizziness, light-headedness and " headaches.       Family History   Problem Relation Name Age of Onset    No Known Problems Mother      No Known Problems Father         Engagement  Call Start Time: 1540 (7/24/2023  3:39 PM)    Medications  Medications reviewed with patient/caregiver?: Yes (7/24/2023  3:39 PM)  Is the patient having any side effects they believe may be caused by any medication additions or changes?: No (7/24/2023  3:39 PM)  Does the patient have all medications ordered at discharge?: Yes (7/24/2023  3:39 PM)  Care Management Interventions: Provided patient education (7/24/2023  3:39 PM)  Is the patient taking all medications as directed (includes completed medication regime)?: Yes (7/24/2023  3:39 PM)  Care Management Interventions: Provided patient education (7/24/2023  3:39 PM)  Medication Comments: Fludrocortisone, Mag Oxide, Potassium Chloride (7/24/2023  3:39 PM)    Appointments  Does the patient have a primary care provider?: Yes (7/24/2023  3:39 PM)  Care Management Interventions: Verified appointment date/time/provider (7/24/2023  3:39 PM)  Has the patient kept scheduled appointments due by today?: Yes (7/24/2023  3:39 PM)  Care Management Interventions: Advised patient to keep appointment; Educated on importance of keeping appointment (7/24/2023  3:39 PM)    Self Management  Has home health visited the patient within 72 hours of discharge?: Not applicable (7/24/2023  3:39 PM)    Patient Teaching  Does the patient have access to their discharge instructions?: Yes (7/24/2023  3:39 PM)  Care Management Interventions: Reviewed instructions with patient (7/24/2023  3:39 PM)  What is the patient's perception of their health status since discharge?: Improving (7/24/2023  3:39 PM)  Is the patient/caregiver able to teach back the hierarchy of who to call/visit for symptoms/problems? PCP, Specialist, Home Health nurse, Urgent Care, ED, 911: Yes (7/24/2023  3:39 PM)    Wrap Up  Wrap Up Additional Comments: Patient is requesting help  finding a pain management doctor. (7/24/2023  3:39 PM)        Has follow up with Dr. Sidhu on 8/7/23.   Dr. Suresh on 8/2/23.  Follow up with Dr. Witt to be scheduled this week.  Follow up with Dr. Krueger on 8/14/23.    Follow up in October for Medicare wellness exam.

## 2023-07-27 ENCOUNTER — LAB (OUTPATIENT)
Dept: LAB | Facility: LAB | Age: 63
End: 2023-07-27
Payer: COMMERCIAL

## 2023-07-27 DIAGNOSIS — R19.7 DIARRHEA, UNSPECIFIED TYPE: ICD-10-CM

## 2023-07-27 PROCEDURE — 87506 IADNA-DNA/RNA PROBE TQ 6-11: CPT

## 2023-07-27 PROCEDURE — 87493 C DIFF AMPLIFIED PROBE: CPT

## 2023-07-28 ENCOUNTER — TELEPHONE (OUTPATIENT)
Dept: PRIMARY CARE | Facility: CLINIC | Age: 63
End: 2023-07-28
Payer: COMMERCIAL

## 2023-07-28 LAB
C. DIFFICILE TOXIN, PCR: NOT DETECTED
CAMPYLOBACTER GP: NOT DETECTED
NOROVIRUS GI/GII: NOT DETECTED
ROTAVIRUS A: NOT DETECTED
SALMONELLA SP.: NOT DETECTED
SHIGA TOXIN 1: NOT DETECTED
SHIGA TOXIN 2: NOT DETECTED
SHIGELLA SP.: NOT DETECTED
VIBRIO GRP.: NOT DETECTED
YERSINIA ENTEROCOLITICA: NOT DETECTED

## 2023-07-28 NOTE — TELEPHONE ENCOUNTER
----- Message from PITO Lowery-CNP sent at 7/28/2023  9:29 AM EDT -----  Let albaro know his stool came back negative for cdiff infection or other infectious causes. Please make sure he follows up with dr. Sidhu.

## 2023-07-30 ASSESSMENT — ENCOUNTER SYMPTOMS
BLOOD IN STOOL: 0
FATIGUE: 0
COLOR CHANGE: 0
VOMITING: 0
ABDOMINAL PAIN: 0
NAUSEA: 0
MYALGIAS: 0
LIGHT-HEADEDNESS: 0
DYSURIA: 0
CHEST TIGHTNESS: 0
ARTHRALGIAS: 0
DIARRHEA: 0
SHORTNESS OF BREATH: 0
CONSTIPATION: 0
PALPITATIONS: 0
HEADACHES: 0
DIZZINESS: 0

## 2023-08-02 ENCOUNTER — APPOINTMENT (OUTPATIENT)
Dept: PRIMARY CARE | Facility: CLINIC | Age: 63
End: 2023-08-02
Payer: COMMERCIAL

## 2023-08-07 ENCOUNTER — DOCUMENTATION (OUTPATIENT)
Dept: CARE COORDINATION | Facility: CLINIC | Age: 63
End: 2023-08-07
Payer: COMMERCIAL

## 2023-08-09 ENCOUNTER — DOCUMENTATION (OUTPATIENT)
Dept: PRIMARY CARE | Facility: CLINIC | Age: 63
End: 2023-08-09
Payer: COMMERCIAL

## 2023-08-09 ENCOUNTER — PATIENT OUTREACH (OUTPATIENT)
Dept: CARE COORDINATION | Facility: CLINIC | Age: 63
End: 2023-08-09
Payer: COMMERCIAL

## 2023-08-09 DIAGNOSIS — R18.8 OTHER ASCITES: ICD-10-CM

## 2023-08-09 RX ORDER — BUMETANIDE 1 MG/1
TABLET ORAL 2 TIMES DAILY
COMMUNITY
Start: 2023-08-08 | End: 2023-09-06

## 2023-08-09 RX ORDER — TALC
POWDER (GRAM) TOPICAL
COMMUNITY
Start: 2023-08-08 | End: 2023-09-06

## 2023-08-09 RX ORDER — OXYCODONE HYDROCHLORIDE 5 MG/1
TABLET ORAL EVERY 4 HOURS PRN
COMMUNITY
Start: 2023-08-08 | End: 2023-08-10

## 2023-08-09 NOTE — PROGRESS NOTES
Discharge Facility:[facility]  Discharge Diagnosis:R18.8 Ascites  Admission Date:8/5/23  Discharge Date: 8/8/23    PCP Appointment Date:8/22/23  Specialist Appointment Date:Dr. Witt 8/9/23  Hospital Encounter and Summary: Linked   See discharge assessment below for further details    Engagement  Call Start Time: 0956 (8/9/2023  9:56 AM)    Medications  Medications reviewed with patient/caregiver?: Yes (8/9/2023  9:56 AM)  Is the patient having any side effects they believe may be caused by any medication additions or changes?: No (8/9/2023  9:56 AM)  Does the patient have all medications ordered at discharge?: Yes (8/9/2023  9:56 AM)  Care Management Interventions: Provided patient education (8/9/2023  9:56 AM)  Is the patient taking all medications as directed (includes completed medication regime)?: Yes (8/9/2023  9:56 AM)  Care Management Interventions: Provided patient education (8/9/2023  9:56 AM)  Medication Comments: Bumetanide 1mg, Melatonin 3mg, Oxycodone 5mg (8/9/2023  9:56 AM)    Appointments  Does the patient have a primary care provider?: Yes (8/9/2023  9:56 AM)  Care Management Interventions: Verified appointment date/time/provider (8/9/2023  9:56 AM)  Has the patient kept scheduled appointments due by today?: Yes (8/9/2023  9:56 AM)  Care Management Interventions: Advised patient to keep appointment; Educated on importance of keeping appointment (8/9/2023  9:56 AM)    Self Management  What is the home health agency?: unsure of name (8/9/2023  9:56 AM)  Has home health visited the patient within 72 hours of discharge?: Call prior to 72 hours (8/9/2023  9:56 AM)    Patient Teaching  Does the patient have access to their discharge instructions?: Yes (8/9/2023  9:56 AM)  Care Management Interventions: Reviewed instructions with patient (8/9/2023  9:56 AM)  What is the patient's perception of their health status since discharge?: Improving (8/9/2023  9:56 AM)  Is the patient/caregiver able to teach back the  hierarchy of who to call/visit for symptoms/problems? PCP, Specialist, Home Health nurse, Urgent Care, ED, 911: Yes (8/9/2023  9:56 AM)

## 2023-08-10 ENCOUNTER — PATIENT OUTREACH (OUTPATIENT)
Dept: CARE COORDINATION | Facility: CLINIC | Age: 63
End: 2023-08-10
Payer: COMMERCIAL

## 2023-08-10 NOTE — PROGRESS NOTES
Outreach call to patient to support a smooth transition of care from recent admission.  Left voicemail message for patient with my contact information.    ALEM Esparza, RN.

## 2023-08-15 DIAGNOSIS — M54.16 CHRONIC RADICULAR LUMBAR PAIN: ICD-10-CM

## 2023-08-15 DIAGNOSIS — G89.29 CHRONIC RADICULAR LUMBAR PAIN: ICD-10-CM

## 2023-08-15 RX ORDER — GABAPENTIN 400 MG/1
800 CAPSULE ORAL 3 TIMES DAILY
Qty: 540 CAPSULE | Refills: 1 | Status: SHIPPED | OUTPATIENT
Start: 2023-08-15 | End: 2023-09-08 | Stop reason: CLARIF

## 2023-08-18 ENCOUNTER — DOCUMENTATION (OUTPATIENT)
Dept: CARE COORDINATION | Facility: CLINIC | Age: 63
End: 2023-08-18
Payer: COMMERCIAL

## 2023-08-18 NOTE — PROGRESS NOTES
Late entry from 8/16/23: Community Health Worker(CHW) outreached patient regarding transportation. Patient did not answer. CHW left voicemail with contact info for a return call.

## 2023-08-22 ENCOUNTER — APPOINTMENT (OUTPATIENT)
Dept: PRIMARY CARE | Facility: CLINIC | Age: 63
End: 2023-08-22
Payer: COMMERCIAL

## 2023-10-10 ENCOUNTER — APPOINTMENT (OUTPATIENT)
Dept: PRIMARY CARE | Facility: CLINIC | Age: 63
End: 2023-10-10
Payer: COMMERCIAL